# Patient Record
Sex: MALE | Race: WHITE | NOT HISPANIC OR LATINO | URBAN - NONMETROPOLITAN AREA
[De-identification: names, ages, dates, MRNs, and addresses within clinical notes are randomized per-mention and may not be internally consistent; named-entity substitution may affect disease eponyms.]

---

## 2018-02-08 PROBLEM — H52.223: Noted: 2019-02-18

## 2018-02-08 PROBLEM — H35.3131: Noted: 2019-02-18

## 2018-02-08 PROBLEM — H52.4: Noted: 2019-02-18

## 2018-02-08 PROBLEM — H52.03: Noted: 2019-02-18

## 2018-02-08 PROBLEM — Z96.1: Noted: 2018-02-08

## 2019-02-18 ENCOUNTER — IMPORTED ENCOUNTER (OUTPATIENT)
Dept: URBAN - NONMETROPOLITAN AREA CLINIC 1 | Facility: CLINIC | Age: 69
End: 2019-02-18

## 2019-02-18 PROCEDURE — 92014 COMPRE OPH EXAM EST PT 1/>: CPT

## 2019-02-18 PROCEDURE — 92015 DETERMINE REFRACTIVE STATE: CPT

## 2019-02-18 NOTE — PATIENT DISCUSSION
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

## 2019-08-20 ENCOUNTER — IMPORTED ENCOUNTER (OUTPATIENT)
Dept: URBAN - NONMETROPOLITAN AREA CLINIC 1 | Facility: CLINIC | Age: 69
End: 2019-08-20

## 2019-08-20 PROBLEM — H52.4: Noted: 2019-02-18

## 2019-08-20 PROBLEM — H35.3131: Noted: 2019-08-20

## 2019-08-20 PROBLEM — H52.03: Noted: 2020-02-21

## 2019-08-20 PROBLEM — H26.493: Noted: 2020-02-21

## 2019-08-20 PROBLEM — H52.4: Noted: 2020-02-21

## 2019-08-20 PROBLEM — H52.223: Noted: 2020-02-21

## 2019-08-20 PROBLEM — H52.03: Noted: 2019-02-18

## 2019-08-20 PROBLEM — H52.223: Noted: 2019-02-18

## 2019-08-20 PROBLEM — Z96.1: Noted: 2019-08-20

## 2019-08-20 PROCEDURE — 99213 OFFICE O/P EST LOW 20 MIN: CPT

## 2019-08-20 PROCEDURE — 92134 CPTRZ OPH DX IMG PST SGM RTA: CPT

## 2019-08-20 NOTE — PATIENT DISCUSSION
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  RPE changes OS: 7/10 SS fine drusen RPE changes-  continue Ocuvite QD PO-  continue [employer] use daily new grid and instructions issued patient to call or come in if changes in vision are noted from today-  monitor 6 months Complete w/ OCT mac or prn Pseudophakia w/ PCO OU-  discussed findings w/patient-  both intraocular lenses are stable and in position-  PCO noted OU no treatment indicated-  monitor yearly or prn; 's Notes: MR 2/18/2019DFE 2/18/2019

## 2020-02-21 ENCOUNTER — IMPORTED ENCOUNTER (OUTPATIENT)
Dept: URBAN - NONMETROPOLITAN AREA CLINIC 1 | Facility: CLINIC | Age: 70
End: 2020-02-21

## 2020-02-21 PROCEDURE — 92014 COMPRE OPH EXAM EST PT 1/>: CPT

## 2020-02-21 PROCEDURE — 92134 CPTRZ OPH DX IMG PST SGM RTA: CPT

## 2020-02-21 PROCEDURE — 92015 DETERMINE REFRACTIVE STATE: CPT

## 2020-02-21 NOTE — PATIENT DISCUSSION
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  RPE changes tr ERM OS: hard and soft drusen RPE changes-  continue Ocuvite QD PO-  continue [employer] use daily new grid and instructions issued patient to call or come in if changes in vision are noted from today-  monitor 6 months Complete w/ Fundus Photos Pseudophakia w/ PCO OU-  discussed findings w/patient-  both intraocular lenses are stable and in position-  PCO noted OU no treatment indicated-  monitor yearly or prn; Dr's Notes: MR 2/21/2020DFE 2/21/2020OCT Mac 2/21/2020

## 2020-09-04 ENCOUNTER — IMPORTED ENCOUNTER (OUTPATIENT)
Dept: URBAN - NONMETROPOLITAN AREA CLINIC 1 | Facility: CLINIC | Age: 70
End: 2020-09-04

## 2020-09-04 PROCEDURE — 92014 COMPRE OPH EXAM EST PT 1/>: CPT

## 2020-09-04 PROCEDURE — 92015 DETERMINE REFRACTIVE STATE: CPT

## 2020-09-04 NOTE — PATIENT DISCUSSION
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  RPE changes tr ERM OS: hard and soft drusen RPE changes-  continue Ocuvite QD PO-  continue [employer] use daily new grid and instructions issued patient to call or come in if changes in vision are noted from today-  monitor 6 month f/u w/OCT MacPseudophakia w/ PCO OU-  discussed findings w/patient-  both intraocular lenses are stable and in position-  PCO noted OU no treatment indicated-  monitor yearly or prn Compound Myopic Astigmatism OU w/Presbyopia-  discussed findings w/patient-  new spectacle Rx issued-  continue to monitor yearly or prn; 's Notes: MR 9/4/2020DFE 9/4/2020OCT Mac 2/21/2020

## 2020-09-05 PROBLEM — H52.4: Noted: 2020-02-21

## 2020-09-05 PROBLEM — H26.493: Noted: 2020-02-21

## 2020-09-05 PROBLEM — Z96.1: Noted: 2019-08-20

## 2020-09-05 PROBLEM — H35.3131: Noted: 2019-08-20

## 2020-09-05 PROBLEM — H52.13: Noted: 2020-09-05

## 2020-09-05 PROBLEM — H52.223: Noted: 2020-02-21

## 2021-03-09 ENCOUNTER — IMPORTED ENCOUNTER (OUTPATIENT)
Dept: URBAN - NONMETROPOLITAN AREA CLINIC 1 | Facility: CLINIC | Age: 71
End: 2021-03-09

## 2021-03-09 PROCEDURE — 92134 CPTRZ OPH DX IMG PST SGM RTA: CPT

## 2021-03-09 PROCEDURE — 92014 COMPRE OPH EXAM EST PT 1/>: CPT

## 2021-03-09 NOTE — PATIENT DISCUSSION
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

## 2022-04-15 ASSESSMENT — VISUAL ACUITY
OS_CC: 20/30
OS_SC: 20/30
OD_SC: 20/30-1
OD_SC: 20/25-1
OS_GLARE: 20/30
OS_SC: 20/30
OD_PH: 20/30
OS_SC: 20/25
OD_SC: 20/25
OD_GLARE: 20/30
OD_SC: 20/40-
OU_SC: 20/20-2
OU_CC: J1
OS_SC: 20/25-2
OD_CC: 20/60+1
OU_SC: 20/25

## 2022-04-15 ASSESSMENT — TONOMETRY
OD_IOP_MMHG: 16
OS_IOP_MMHG: 16
OD_IOP_MMHG: 15
OS_IOP_MMHG: 16
OS_IOP_MMHG: 21
OD_IOP_MMHG: 15
OD_IOP_MMHG: 19
OS_IOP_MMHG: 17
OD_IOP_MMHG: 16
OS_IOP_MMHG: 15